# Patient Record
Sex: MALE | Race: WHITE | NOT HISPANIC OR LATINO | URBAN - NONMETROPOLITAN AREA
[De-identification: names, ages, dates, MRNs, and addresses within clinical notes are randomized per-mention and may not be internally consistent; named-entity substitution may affect disease eponyms.]

---

## 2023-05-17 ENCOUNTER — OFFICE (OUTPATIENT)
Dept: URBAN - NONMETROPOLITAN AREA CLINIC 5 | Facility: CLINIC | Age: 58
End: 2023-05-17
Payer: COMMERCIAL

## 2023-05-17 VITALS
DIASTOLIC BLOOD PRESSURE: 78 MMHG | RESPIRATION RATE: 18 BRPM | SYSTOLIC BLOOD PRESSURE: 134 MMHG | HEART RATE: 69 BPM | HEIGHT: 69 IN | WEIGHT: 254 LBS

## 2023-05-17 DIAGNOSIS — R07.89 OTHER CHEST PAIN: ICD-10-CM

## 2023-05-17 DIAGNOSIS — K62.5 HEMORRHAGE OF ANUS AND RECTUM: ICD-10-CM

## 2023-05-17 DIAGNOSIS — K21.9 GASTRO-ESOPHAGEAL REFLUX DISEASE WITHOUT ESOPHAGITIS: ICD-10-CM

## 2023-05-17 PROCEDURE — 99204 OFFICE O/P NEW MOD 45 MIN: CPT | Performed by: INTERNAL MEDICINE

## 2023-05-17 NOTE — SERVICEHPINOTES
Howard Matos   is a   59 yo  male  with a past medical history of HTN, HLD, CAD status post 4V CABG, and GERD who presents to establish care for recent GI bleed in the need for colonoscopy.  Patient reports a remote history of having severe reflux symptoms and was started on Nexium.  An EGD and colonoscopy were performed at that time without sedation be he reports that everything looked normal.  He does report recently he got violently ill after cleaning an old house he was working on and he thought he may have ingested something.  He had pain in his abdomen which felt like a kidney stone. He then had a significant amount of blood in his stool which subsequently resolved.  He denied similar episodes in the past.  He does endorse a history of coronary disease status post four-vessel CABG.  He also endorses history of substance abuse and is in recovery.  He feels that sedation makes him have onset depression.  He denies any dysphagia, odynophagia.  He does endorse a chest pain and pressure which was worse with exertion.  He was recently seen by the cardiologist and reports he had an echo.  He is unclear if he had a repeat heart catheterization.  He did undergo cardiac ablation and believes his symptoms have somewhat improved but continues with postprandial chest pain.  He does report a history of hiatal hernia.  He has not had an EGD in many years.

## 2023-05-17 NOTE — SERVICENOTES
Given patient's noncardiac chest pain, recent episode of hematochezia, and need for screening colonoscopy will plan for EGD and colonoscopy after obtaining cardiac clearance.  Patient was quite concerned about being given anesthesia and the risk of depression.  Counseled him that propofol isneither benzo nor an opioid and there should not be any lasting effects.  He will do his own research and may opt to do the procedure without sedation.  Will arrange it BM.   He is high risk for the procedure given his cardiac history and obesity

## 2023-06-21 ENCOUNTER — ON CAMPUS - OUTPATIENT (OUTPATIENT)
Dept: URBAN - NONMETROPOLITAN AREA HOSPITAL 28 | Facility: HOSPITAL | Age: 58
End: 2023-06-21
Payer: COMMERCIAL

## 2023-06-21 DIAGNOSIS — K22.70 BARRETT'S ESOPHAGUS WITHOUT DYSPLASIA: ICD-10-CM

## 2023-06-21 DIAGNOSIS — D12.4 BENIGN NEOPLASM OF DESCENDING COLON: ICD-10-CM

## 2023-06-21 DIAGNOSIS — D12.2 BENIGN NEOPLASM OF ASCENDING COLON: ICD-10-CM

## 2023-06-21 DIAGNOSIS — K31.89 OTHER DISEASES OF STOMACH AND DUODENUM: ICD-10-CM

## 2023-06-21 DIAGNOSIS — K21.9 GASTRO-ESOPHAGEAL REFLUX DISEASE WITHOUT ESOPHAGITIS: ICD-10-CM

## 2023-06-21 DIAGNOSIS — K92.1 MELENA: ICD-10-CM

## 2023-06-21 DIAGNOSIS — D12.3 BENIGN NEOPLASM OF TRANSVERSE COLON: ICD-10-CM

## 2023-06-21 PROCEDURE — 43239 EGD BIOPSY SINGLE/MULTIPLE: CPT | Performed by: INTERNAL MEDICINE

## 2023-06-21 PROCEDURE — 45385 COLONOSCOPY W/LESION REMOVAL: CPT | Performed by: INTERNAL MEDICINE
